# Patient Record
Sex: FEMALE | Race: WHITE | NOT HISPANIC OR LATINO | Employment: FULL TIME | URBAN - METROPOLITAN AREA
[De-identification: names, ages, dates, MRNs, and addresses within clinical notes are randomized per-mention and may not be internally consistent; named-entity substitution may affect disease eponyms.]

---

## 2018-06-25 PROBLEM — F32.A DEPRESSION WITH SUICIDAL IDEATION: Status: ACTIVE | Noted: 2018-06-25

## 2018-06-25 PROBLEM — R45.851 DEPRESSION WITH SUICIDAL IDEATION: Status: ACTIVE | Noted: 2018-06-25

## 2018-06-26 PROBLEM — F31.81 BIPOLAR II DISORDER, MOST RECENT EPISODE MAJOR DEPRESSIVE: Status: ACTIVE | Noted: 2018-06-25

## 2018-06-26 PROBLEM — F43.12 CHRONIC POST-TRAUMATIC STRESS DISORDER (PTSD): Status: ACTIVE | Noted: 2018-06-26

## 2018-06-27 PROBLEM — R51.9 FREQUENT HEADACHES: Status: ACTIVE | Noted: 2018-06-27

## 2018-06-27 PROBLEM — I10 ESSENTIAL HYPERTENSION: Status: ACTIVE | Noted: 2018-06-27

## 2018-06-27 PROBLEM — E78.49 OTHER HYPERLIPIDEMIA: Status: ACTIVE | Noted: 2018-06-27

## 2018-06-29 ENCOUNTER — HOSPITAL ENCOUNTER (OUTPATIENT)
Dept: PSYCHIATRY | Facility: HOSPITAL | Age: 53
Discharge: HOME | End: 2018-06-29
Attending: PSYCHIATRY & NEUROLOGY
Payer: COMMERCIAL

## 2018-06-29 VITALS
HEIGHT: 64 IN | HEART RATE: 76 BPM | BODY MASS INDEX: 31.48 KG/M2 | SYSTOLIC BLOOD PRESSURE: 140 MMHG | WEIGHT: 184.38 LBS | DIASTOLIC BLOOD PRESSURE: 71 MMHG | TEMPERATURE: 98 F | RESPIRATION RATE: 16 BRPM

## 2018-06-29 DIAGNOSIS — F31.81 BIPOLAR II DISORDER, MOST RECENT EPISODE MAJOR DEPRESSIVE: ICD-10-CM

## 2018-06-29 DIAGNOSIS — F43.12 CHRONIC POST-TRAUMATIC STRESS DISORDER (PTSD): Primary | ICD-10-CM

## 2018-06-29 LAB
AMPHET+METHAMPHET UR QL: NEGATIVE
BARBITURATES UR QL SCN>200 NG/ML: NORMAL
BENZODIAZ UR QL SCN>200 NG/ML: NEGATIVE
BZE UR QL SCN: NEGATIVE
CANNABINOIDS UR QL SCN: NORMAL
CREAT UR-MCNC: 41 MG/DL
ETHANOL UR-MCNC: <10 MG/DL
METHADONE UR QL SCN>300 NG/ML: NEGATIVE
OPIATES UR QL SCN: NEGATIVE
PCP UR QL SCN>25 NG/ML: NEGATIVE
TOXICOLOGY INFORMATION: NORMAL

## 2018-06-29 PROCEDURE — 90853 GROUP PSYCHOTHERAPY: CPT | Mod: 59,,, | Performed by: PSYCHOLOGIST

## 2018-06-29 PROCEDURE — 80307 DRUG TEST PRSMV CHEM ANLYZR: CPT

## 2018-06-29 PROCEDURE — 99223 1ST HOSP IP/OBS HIGH 75: CPT | Mod: ,,, | Performed by: PSYCHIATRY & NEUROLOGY

## 2018-06-29 NOTE — PROGRESS NOTES
Group Psychotherapy (PhD/LCSW)    Site: [facility]    Clinical status of patient: Intensive Outpatient Program (IOP)    Date: 6/29/2018    Group Focus: Psychodynamic Group Psychotherapy    Length of service: 79731 - 45-50 minutes    Number of patients in attendance: 4    Referred by: Behavioral Medicine Unit Treatment Team    Target symptoms: Depression and Anxiety    Patient's response to treatment: Active Listening    Progress toward goals: Progressing adequately    Interval History: Pt introduced herself to the group appropriately and discussed goals for treatment.    Diagnosis: PTSD    Plan: Continue treatment on BMU

## 2018-06-29 NOTE — PROGRESS NOTES
Group Psychotherapy (PhD/LCSW)    Site: [facility]    Clinical status of patient: Intensive Outpatient Program (IOP)    Date: 6/29/2018    Group Focus: Stress Management    Length of service: 40833 - 45-50 minutes    Number of patients in attendance: 15    Referred by: Behavioral Medicine Unit Treatment Team    Target symptoms: Depression and Anxiety    Patient's response to treatment: Active Listening    Progress toward goals: Progressing adequately    Interval History: Group learned mindfulness techniques (breath, senses, progressive muscle relaxation) to improve present-moment awareness, impulsive behavior tendencies, and tolerance of various emotional states.    Diagnosis: PTSD    Plan: Continue treatment on BMU
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
## 2018-07-02 ENCOUNTER — HOSPITAL ENCOUNTER (OUTPATIENT)
Dept: PSYCHIATRY | Facility: HOSPITAL | Age: 53
Discharge: HOME | End: 2018-07-02
Attending: PSYCHIATRY & NEUROLOGY
Payer: COMMERCIAL

## 2018-07-02 NOTE — NURSING
Pt.'s spouse called and stated the pt. Was not returning to the program.  Stated she did not like it and did not like the drive here.  Acknowledged understanding and offered our services for anything else we could do for the pt. And spouse declined.  Pt. Will be discharged from the BMU.
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
